# Patient Record
Sex: FEMALE | Race: WHITE | NOT HISPANIC OR LATINO | URBAN - NONMETROPOLITAN AREA
[De-identification: names, ages, dates, MRNs, and addresses within clinical notes are randomized per-mention and may not be internally consistent; named-entity substitution may affect disease eponyms.]

---

## 2019-08-22 ENCOUNTER — OFFICE VISIT (OUTPATIENT)
Dept: OBSTETRICS AND GYNECOLOGY | Facility: CLINIC | Age: 69
End: 2019-08-22

## 2019-08-22 VITALS
SYSTOLIC BLOOD PRESSURE: 122 MMHG | BODY MASS INDEX: 30.16 KG/M2 | HEIGHT: 65 IN | DIASTOLIC BLOOD PRESSURE: 70 MMHG | WEIGHT: 181 LBS

## 2019-08-22 DIAGNOSIS — Z01.419 ENCOUNTER FOR GYNECOLOGICAL EXAMINATION WITHOUT ABNORMAL FINDING: Primary | ICD-10-CM

## 2019-08-22 PROCEDURE — G0101 CA SCREEN;PELVIC/BREAST EXAM: HCPCS | Performed by: OBSTETRICS & GYNECOLOGY

## 2019-08-22 RX ORDER — METFORMIN HYDROCHLORIDE 500 MG/1
TABLET, EXTENDED RELEASE ORAL
Refills: 3 | COMMUNITY
Start: 2019-05-22

## 2019-08-22 RX ORDER — AMLODIPINE BESYLATE 5 MG/1
5 TABLET ORAL DAILY
Refills: 6 | COMMUNITY
Start: 2019-07-02

## 2019-08-22 RX ORDER — CALCIUM CARB/VITAMIN D3/VIT K1 500-500-40
TABLET,CHEWABLE ORAL
COMMUNITY
Start: 2015-04-21

## 2019-08-22 RX ORDER — BRIMONIDINE TARTRATE/TIMOLOL 0.2%-0.5%
DROPS OPHTHALMIC (EYE)
Refills: 6 | COMMUNITY
Start: 2019-05-23

## 2019-08-22 RX ORDER — MECLIZINE HYDROCHLORIDE 25 MG/1
TABLET ORAL
Refills: 1 | COMMUNITY
Start: 2019-05-22

## 2019-08-22 NOTE — PROGRESS NOTES
Subjective   Chief Complaint   Patient presents with   • Gynecologic Exam     Last pap 2016 WNL, states she has bee noff her hormones for 2 years now, Mammogram scheduled      Sugar Goodman is a 68 y.o. year old  presenting to be seen for her annual exam. Retired 2 years ago--childhood DILIA exposure  SEXUAL Hx:  She is currently sexually active.  In the past year there has not been new sexual partners.    Condoms are not typically used.  She would not like to be screened for STD's at today's exam.  Current birth control method: not using any form of contraception and does not wish to get pregnant.  MENSTRUAL Hx:  No LMP recorded. Patient has had a hysterectomy.  In the past 6 months her cycles have been absent.   Her menstrual flow has been absent.   Each month on average there are roughly 0 days of very heavy flow.    Intermenstrual bleeding is absent.    Post-coital bleeding is absent.  Dysmenorrhea: is not affecting her activities of daily living  PMS: is not affecting her activities of daily living  Her cycles are not a source of concern for her that she wishes to discuss today.  HEALTH Hx:  She exercises regularly: no (and has no plans to become more active).  She wears her seat belt:yes.  She has concerns about domestic violence: no.  OTHER COMPLAINTS:  Nothing else    The following portions of the patient's history were reviewed and updated as appropriate:  She  has no past medical history on file.  She does not have a problem list on file.  She  has a past surgical history that includes Oophorectomy; Hysterectomy; Tubal ligation; and Induced .  Her family history includes Breast cancer in her mother.  She  reports that she has never smoked. She has never used smokeless tobacco. She reports that she does not drink alcohol or use drugs.  Current Outpatient Medications   Medication Sig Dispense Refill   • Chromium Picolinate 1000 MCG tablet Take  by mouth.     • amLODIPine (NORVASC) 5 MG  "tablet Take 5 mg by mouth Daily.  6   • COMBIGAN 0.2-0.5 % ophthalmic solution INSTILL 1 DROP INTO BOTH EYES TWICE A DAY AS DIRECTED  6   • meclizine (ANTIVERT) 25 MG tablet TAKE 1 TABLET EVERY 6 HOURS AS NEEDED FOR DIZZINESS.  1   • metFORMIN ER (GLUCOPHAGE-XR) 500 MG 24 hr tablet TAKE 1 TABLET DAILY WITH EVENING MEAL  3     No current facility-administered medications for this visit.      Current Outpatient Medications on File Prior to Visit   Medication Sig   • Chromium Picolinate 1000 MCG tablet Take  by mouth.   • amLODIPine (NORVASC) 5 MG tablet Take 5 mg by mouth Daily.   • COMBIGAN 0.2-0.5 % ophthalmic solution INSTILL 1 DROP INTO BOTH EYES TWICE A DAY AS DIRECTED   • meclizine (ANTIVERT) 25 MG tablet TAKE 1 TABLET EVERY 6 HOURS AS NEEDED FOR DIZZINESS.   • metFORMIN ER (GLUCOPHAGE-XR) 500 MG 24 hr tablet TAKE 1 TABLET DAILY WITH EVENING MEAL     No current facility-administered medications on file prior to visit.      She has No Known Allergies..    Social History    Tobacco Use      Smoking status: Never Smoker      Smokeless tobacco: Never Used    Review of Systems  Consitutional NEG: anorexia or night sweats    POS: nothing reported   Gastointestinal NEG: bloating, change in bowel habits, melena or reflux symptoms    POS: nothing reported   Genitourinary NEG: dysuria or hematuria    POS: nothing reported   Integument NEG: moles that are changing in size, shape, color or rashes    POS: nothing reported   Breast NEG: persistent breast lump, skin dimpling or nipple discharge    POS: nothing reported          Objective   /70   Ht 165.1 cm (65\")   Wt 82.1 kg (181 lb)   BMI 30.12 kg/m²     General:  well developed; well nourished  no acute distress   Skin:  No suspicious lesions seen   Thyroid: normal to inspection and palpation   Breasts:  Examined in supine position  Symmetric without masses or skin dimpling  Nipples normal without inversion, lesions or discharge  There are no palpable axillary " nodes   Abdomen: soft, non-tender; no masses  no umbilical or inguinal hernias are present  no hepato-splenomegaly   Pelvis: Clinical staff was present for exam  External genitalia:  normal appearance of the external genitalia including Bartholin's and [place]'s glands.  :  urethral meatus normal;  Vaginal:  normal pink mucosa without prolapse or lesions.  Cervix:  normal appearance.  Uterus:  normal size, shape and consistency.  Adnexa:  normal bimanual exam of the adnexa.  Rectal:  digital rectal exam not performed; anus visually normal appearing.        Assessment   1. Normal PE     Plan   1. PAP done  2. H/O DILIA expsure-- cont PAP  3. Follow up     No orders of the defined types were placed in this encounter.         This note was electronically signed.      August 22, 2019

## 2019-08-29 DIAGNOSIS — Z01.419 ENCOUNTER FOR GYNECOLOGICAL EXAMINATION WITHOUT ABNORMAL FINDING: ICD-10-CM

## 2020-06-04 ENCOUNTER — TELEPHONE (OUTPATIENT)
Dept: OBSTETRICS AND GYNECOLOGY | Facility: CLINIC | Age: 70
End: 2020-06-04

## 2020-06-04 NOTE — TELEPHONE ENCOUNTER
Patient has been off hormone replacement therapy for too long to reinstitute at this time.  There is to greater cardiovascular risk.  She may return to clinic for us to discuss other alternative options.  Thanks

## 2020-06-05 ENCOUNTER — OFFICE VISIT (OUTPATIENT)
Dept: OBSTETRICS AND GYNECOLOGY | Facility: CLINIC | Age: 70
End: 2020-06-05

## 2020-06-05 VITALS
BODY MASS INDEX: 28.82 KG/M2 | WEIGHT: 173 LBS | DIASTOLIC BLOOD PRESSURE: 74 MMHG | SYSTOLIC BLOOD PRESSURE: 122 MMHG | HEIGHT: 65 IN

## 2020-06-05 DIAGNOSIS — G47.09 OTHER INSOMNIA: ICD-10-CM

## 2020-06-05 DIAGNOSIS — N95.1 MENOPAUSAL SYMPTOMS: Primary | ICD-10-CM

## 2020-06-05 PROCEDURE — 99214 OFFICE O/P EST MOD 30 MIN: CPT | Performed by: OBSTETRICS & GYNECOLOGY

## 2020-06-05 RX ORDER — TRAZODONE HYDROCHLORIDE 50 MG/1
50 TABLET ORAL NIGHTLY
Qty: 30 TABLET | Refills: 2 | Status: SHIPPED | OUTPATIENT
Start: 2020-06-05 | End: 2020-07-01 | Stop reason: SDUPTHER

## 2020-06-05 RX ORDER — LATANOPROST 50 UG/ML
1 SOLUTION/ DROPS OPHTHALMIC NIGHTLY
COMMUNITY

## 2020-06-05 NOTE — PROGRESS NOTES
Subjective   Chief Complaint   Patient presents with   • Hormonal Issues     Sugar Goodman is a 69 y.o. year old .  No LMP recorded. Patient has had a hysterectomy.  She presents to be seen because of worsening insomnia and mood lability.  Patient's been off HRT for 3 years.  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  She states she is not getting any sleep and feels that this is greatly impacting her mood.  She states in the past she has used a significant amount of Benadryl to achieve sleep but this is no longer working.  She does have quite a few hot flashes.  No new medicines.  She is elected to try Ambien.  She just feels if she can get some more sleep things will improve..     OTHER COMPLAINTS:  Nothing else    The following portions of the patient's history were reviewed and updated as appropriate:  She  has no past medical history on file.  She does not have a problem list on file.  She  has a past surgical history that includes Oophorectomy; Hysterectomy; Tubal ligation; and Induced .  Her family history includes Breast cancer in her mother.  She  reports that she has never smoked. She has never used smokeless tobacco. She reports that she does not drink alcohol or use drugs.  Current Outpatient Medications   Medication Sig Dispense Refill   • latanoprost (XALATAN) 0.005 % ophthalmic solution 1 drop Every Night.     • amLODIPine (NORVASC) 5 MG tablet Take 5 mg by mouth Daily.  6   • Chromium Picolinate 1000 MCG tablet Take  by mouth.     • COMBIGAN 0.2-0.5 % ophthalmic solution INSTILL 1 DROP INTO BOTH EYES TWICE A DAY AS DIRECTED  6   • meclizine (ANTIVERT) 25 MG tablet TAKE 1 TABLET EVERY 6 HOURS AS NEEDED FOR DIZZINESS.  1   • metFORMIN ER (GLUCOPHAGE-XR) 500 MG 24 hr tablet TAKE 1 TABLET DAILY WITH EVENING MEAL  3     No current facility-administered medications for this visit.      Current Outpatient Medications on File Prior to Visit   Medication  "Sig   • latanoprost (XALATAN) 0.005 % ophthalmic solution 1 drop Every Night.   • amLODIPine (NORVASC) 5 MG tablet Take 5 mg by mouth Daily.   • Chromium Picolinate 1000 MCG tablet Take  by mouth.   • COMBIGAN 0.2-0.5 % ophthalmic solution INSTILL 1 DROP INTO BOTH EYES TWICE A DAY AS DIRECTED   • meclizine (ANTIVERT) 25 MG tablet TAKE 1 TABLET EVERY 6 HOURS AS NEEDED FOR DIZZINESS.   • metFORMIN ER (GLUCOPHAGE-XR) 500 MG 24 hr tablet TAKE 1 TABLET DAILY WITH EVENING MEAL     No current facility-administered medications on file prior to visit.      She has No Known Allergies.    Social History    Tobacco Use      Smoking status: Never Smoker      Smokeless tobacco: Never Used    Review of Systems  Consitutional POS: nothing reported    NEG: anorexia or night sweats   Gastointestinal POS: nothing reported    NEG: bloating, change in bowel habits, melena or reflux symptoms   Genitourinary POS: nothing reported    NEG: dysuria or hematuria   Integument POS: nothing reported    NEG: moles that are changing in size, shape, color or rashes   Breast POS: nothing reported    NEG: persistent breast lump, skin dimpling or nipple discharge         Respiratory: negative  Cardiovascular: negative          Objective   /74   Ht 165.1 cm (65\")   Wt 78.5 kg (173 lb)   BMI 28.79 kg/m²     General:  well developed; well nourished  no acute distress   Skin:  No suspicious lesions seen   Thyroid: normal to inspection and palpation   Lungs:  breathing is unlabored   Heart:  Not performed.   Breasts:  Not performed.   Abdomen: soft, non-tender; no masses  no umbilical or inguinal hernias are present  no hepato-splenomegaly   Pelvis: Not performed.     Psychiatric: Alert and oriented ×3, mood and affect appropriate  HEENT: Atraumatic, normocephalic, normal scleral icterus  Extremities: 2+ pulses bilaterally, no edema      Lab Review   No data reviewed    Imaging   No data reviewed        Assessment   1. Spent over 30 minutes with " patient face-to-face time discussing her insomnia and mood lability issues.  We discussed SSRIs versus trazodone as a trial.  I would encourage her to add some natural progesterone cream nightly and continue Estroven but would not use any black cohosh.  Furthermore given the many years since usage of HRT I would not reinstitute estrogen due to cardiovascular risks.     Plan   1. As above.  Trazodone 50 mg p.o. nightly called in.  Do not mix with other medicines i.e. Benadryl.  Patient to call back in 2 to 3 weeks for reassessment.  2.     No orders of the defined types were placed in this encounter.         This note was electronically signed.      June 5, 2020

## 2020-06-26 ENCOUNTER — TELEPHONE (OUTPATIENT)
Dept: OBSTETRICS AND GYNECOLOGY | Facility: CLINIC | Age: 70
End: 2020-06-26

## 2020-06-26 NOTE — TELEPHONE ENCOUNTER
----- Message from Brittaney Choudhury sent at 6/26/2020  8:56 AM EDT -----  Contact: PATIENT  PT CALLED AND STATES THAT THE MEDS THAT YOU HAD PRESCRIBED AND IT IS NOT HELPING. SHE SAID THAT SHE TRIED IT FOR 3 DAYS BY ITSELF AND IT DID NOT MAKE ANY CHANGE. SHE SAID SHE HAS TAKEN IT WITH  3 TYLENOL PM, 10 MG MELATONIN, L-THEANINE AND  ASHWAGHAN THE LAST THREE NIGHTS AND THAT HAS WORKED. SHE WANTS TO KNOW IF SHE COULD POSSIBLY DOUBLE OR CHANGE TO SOMETHING ELSE.      RODRIGO VIRAMONTES

## 2020-07-01 ENCOUNTER — TELEPHONE (OUTPATIENT)
Dept: OBSTETRICS AND GYNECOLOGY | Facility: CLINIC | Age: 70
End: 2020-07-01

## 2020-07-01 RX ORDER — TRAZODONE HYDROCHLORIDE 50 MG/1
50 TABLET ORAL NIGHTLY
Qty: 90 TABLET | Refills: 5 | Status: SHIPPED | OUTPATIENT
Start: 2020-07-01 | End: 2020-07-01 | Stop reason: DRUGHIGH

## 2020-07-01 RX ORDER — TRAZODONE HYDROCHLORIDE 100 MG/1
100 TABLET ORAL NIGHTLY
Qty: 90 TABLET | Refills: 5 | Status: SHIPPED | OUTPATIENT
Start: 2020-07-01 | End: 2020-08-11

## 2020-07-01 NOTE — TELEPHONE ENCOUNTER
----- Message from Abimbola Limon sent at 7/1/2020  1:52 PM EDT -----  Contact: PATIENT  Patient called and stated that [facility] needs a 90 day script of her trazodone sent in instead of a 30 day supply. She sees Dr. Miller

## 2020-08-04 ENCOUNTER — TELEPHONE (OUTPATIENT)
Dept: OBSTETRICS AND GYNECOLOGY | Facility: CLINIC | Age: 70
End: 2020-08-04

## 2020-08-04 NOTE — TELEPHONE ENCOUNTER
----- Message from Vivi Childress sent at 8/4/2020  1:46 PM EDT -----  Contact: PATIENT  Patient is requesting something similar to the trazodone be sent in for her. She is having extreme leg weakness. She is having to sit down before she falls. Patient has stopped the medication and is having hot flashes again. States that she had 7 last night. Keeping her from sleeping.     Dr. Miller  [facility] / [place]  Patient call back: [phone]

## 2020-08-05 NOTE — TELEPHONE ENCOUNTER
Only other alternative is an SSRI.  This would be something like Celexa or Lexapro or others within that family.  She will have to come in to discuss this if she wishes to try.

## 2020-08-11 ENCOUNTER — OFFICE VISIT (OUTPATIENT)
Dept: OBSTETRICS AND GYNECOLOGY | Facility: CLINIC | Age: 70
End: 2020-08-11

## 2020-08-11 VITALS
HEIGHT: 65 IN | BODY MASS INDEX: 28.66 KG/M2 | WEIGHT: 172 LBS | SYSTOLIC BLOOD PRESSURE: 126 MMHG | DIASTOLIC BLOOD PRESSURE: 72 MMHG

## 2020-08-11 DIAGNOSIS — N95.1 MENOPAUSAL SYMPTOMS: Primary | ICD-10-CM

## 2020-08-11 PROCEDURE — 99213 OFFICE O/P EST LOW 20 MIN: CPT | Performed by: OBSTETRICS & GYNECOLOGY

## 2020-08-11 RX ORDER — CITALOPRAM 10 MG/1
10 TABLET ORAL EVERY MORNING
Qty: 30 TABLET | Refills: 6 | Status: SHIPPED | OUTPATIENT
Start: 2020-08-11 | End: 2021-09-27

## 2020-08-11 NOTE — PROGRESS NOTES
Subjective   Chief Complaint   Patient presents with   • Follow-up     discuss meds      Sugarrachel Goodman is a 69 y.o. year old .  No LMP recorded. Patient has had a hysterectomy.  She presents to be seen because of vasomotor symptoms, mood lability. .     OTHER COMPLAINTS:  Nothing else    The following portions of the patient's history were reviewed and updated as appropriate:  She  has no past medical history on file.  She does not have a problem list on file.  She  has a past surgical history that includes Oophorectomy; Hysterectomy; Tubal ligation; and Induced .  Her family history includes Breast cancer in her mother.  She  reports that she has never smoked. She has never used smokeless tobacco. She reports that she does not drink alcohol or use drugs.  Current Outpatient Medications   Medication Sig Dispense Refill   • amLODIPine (NORVASC) 5 MG tablet Take 5 mg by mouth Daily.  6   • Chromium Picolinate 1000 MCG tablet Take  by mouth.     • COMBIGAN 0.2-0.5 % ophthalmic solution INSTILL 1 DROP INTO BOTH EYES TWICE A DAY AS DIRECTED  6   • latanoprost (XALATAN) 0.005 % ophthalmic solution 1 drop Every Night.     • meclizine (ANTIVERT) 25 MG tablet TAKE 1 TABLET EVERY 6 HOURS AS NEEDED FOR DIZZINESS.  1   • metFORMIN ER (GLUCOPHAGE-XR) 500 MG 24 hr tablet TAKE 1 TABLET DAILY WITH EVENING MEAL  3   • traZODone (DESYREL) 100 MG tablet Take 1 tablet by mouth Every Night. 90 tablet 5     No current facility-administered medications for this visit.      Current Outpatient Medications on File Prior to Visit   Medication Sig   • amLODIPine (NORVASC) 5 MG tablet Take 5 mg by mouth Daily.   • Chromium Picolinate 1000 MCG tablet Take  by mouth.   • COMBIGAN 0.2-0.5 % ophthalmic solution INSTILL 1 DROP INTO BOTH EYES TWICE A DAY AS DIRECTED   • latanoprost (XALATAN) 0.005 % ophthalmic solution 1 drop Every Night.   • meclizine (ANTIVERT) 25 MG tablet TAKE 1 TABLET EVERY 6 HOURS AS NEEDED FOR DIZZINESS.   •  "metFORMIN ER (GLUCOPHAGE-XR) 500 MG 24 hr tablet TAKE 1 TABLET DAILY WITH EVENING MEAL   • traZODone (DESYREL) 100 MG tablet Take 1 tablet by mouth Every Night.     No current facility-administered medications on file prior to visit.      She has No Known Allergies.    Social History    Tobacco Use      Smoking status: Never Smoker      Smokeless tobacco: Never Used    Review of Systems  Consitutional POS: nothing reported    NEG: anorexia or night sweats   Gastointestinal POS: nothing reported    NEG: bloating, change in bowel habits, melena or reflux symptoms   Genitourinary POS: nothing reported    NEG: dysuria or hematuria   Integument POS: nothing reported    NEG: moles that are changing in size, shape, color or rashes   Breast POS: nothing reported    NEG: persistent breast lump, skin dimpling or nipple discharge         Respiratory: negative  Cardiovascular: negative          Objective   /72   Ht 165.1 cm (65\")   Wt 78 kg (172 lb)   BMI 28.62 kg/m²     General:  well developed; well nourished  no acute distress   Skin:  No suspicious lesions seen   Thyroid: not examined   Lungs:  breathing is unlabored   Heart:  Not performed.   Breasts:  Not performed.   Abdomen: soft, non-tender; no masses  no umbilical or inguinal hernias are present  no hepato-splenomegaly   Pelvis: Not performed.     Psychiatric: Alert and oriented ×3, mood and affect appropriate  HEENT: Atraumatic, normocephalic, normal scleral icterus  Extremities: 2+ pulses bilaterally, no edema      Lab Review   No data reviewed    Imaging   No data reviewed        Assessment   1. Vasomotor symptoms, maura difficulty sleeping     Plan   1. Celexa 10mg  2. Triamcinolone ointment 3 times daily for suspected contact dermatitis and intertriginous regions.    No orders of the defined types were placed in this encounter.         This note was electronically signed.      August 11, 2020      "

## 2021-04-19 RX ORDER — RISEDRONATE SODIUM 150 MG/1
150 TABLET, FILM COATED ORAL
Qty: 1 TABLET | Refills: 12 | Status: SHIPPED | OUTPATIENT
Start: 2021-04-19

## 2021-09-27 RX ORDER — CITALOPRAM 10 MG/1
10 TABLET ORAL EVERY MORNING
Qty: 90 TABLET | Refills: 12 | Status: SHIPPED | OUTPATIENT
Start: 2021-09-27
